# Patient Record
Sex: FEMALE | Race: OTHER
[De-identification: names, ages, dates, MRNs, and addresses within clinical notes are randomized per-mention and may not be internally consistent; named-entity substitution may affect disease eponyms.]

---

## 2018-05-07 ENCOUNTER — HOSPITAL ENCOUNTER (OUTPATIENT)
Dept: HOSPITAL 88 - MAMMO | Age: 68
End: 2018-05-07
Attending: INTERNAL MEDICINE
Payer: MEDICARE

## 2018-05-07 DIAGNOSIS — Z12.31: Primary | ICD-10-CM

## 2018-05-07 PROCEDURE — 77067 SCR MAMMO BI INCL CAD: CPT

## 2018-05-11 NOTE — DIAGNOSTIC IMAGING REPORT
#[id number] - [id number]

#BILATERAL FIRST EVER DIGITAL SCREENING MAMMOGRAM WITH CAD: 5/7/2018

CLINICAL: Routine screening.  Baseline exam.  



No prior exams were available for comparison.  Current study contains 4 films.  

There are scattered fibroglandular elements in both breasts.  

Current study was also evaluated with a Computer Aided Detection (CAD) system.  

There are benign vascular calcifications in both breasts.  

No significant masses, calcifications, or other findings are seen in either breast.  



IMPRESSION: BENIGN

There is no mammographic evidence of malignancy.  A 1 year screening mammogram is recommended. 

The patient will be notified by letter of the results.  





Gurjit mann/jannie:5/10/2018 10:38:49  



Imaging Technologist: Shy ARNOLD)(CLYDE), [facility]

letter sent: Normal Exam  

Mammogram BI-RADS: 2 Benign